# Patient Record
Sex: MALE | Race: WHITE | Employment: OTHER
[De-identification: names, ages, dates, MRNs, and addresses within clinical notes are randomized per-mention and may not be internally consistent; named-entity substitution may affect disease eponyms.]

---

## 2024-06-14 PROBLEM — I20.0 UNSTABLE ANGINA (HCC): Status: ACTIVE | Noted: 2024-06-14

## 2024-10-09 ENCOUNTER — CARE COORDINATION (OUTPATIENT)
Dept: OTHER | Facility: CLINIC | Age: 69
End: 2024-10-09

## 2024-10-09 NOTE — CARE COORDINATION
ACM assigned per population health report. Chart reviewed. No needs identified. ACM signing off. No outreach planned. Program closed.    RAMIRO Bear RN  Ambulatory Care Manager  Phone: [phone]  Email: [email or web address]